# Patient Record
Sex: FEMALE | URBAN - METROPOLITAN AREA
[De-identification: names, ages, dates, MRNs, and addresses within clinical notes are randomized per-mention and may not be internally consistent; named-entity substitution may affect disease eponyms.]

---

## 2024-09-30 ENCOUNTER — APPOINTMENT (RX ONLY)
Dept: URBAN - METROPOLITAN AREA CLINIC 151 | Facility: CLINIC | Age: 39
Setting detail: DERMATOLOGY
End: 2024-09-30

## 2024-09-30 DIAGNOSIS — L63.8 OTHER ALOPECIA AREATA: ICD-10-CM

## 2024-09-30 PROCEDURE — ? PRESCRIPTION MEDICATION MANAGEMENT

## 2024-09-30 PROCEDURE — ? ORDER TESTS

## 2024-09-30 PROCEDURE — ? PRESCRIPTION

## 2024-09-30 PROCEDURE — ? DIAGNOSIS COMMENT

## 2024-09-30 PROCEDURE — 99204 OFFICE O/P NEW MOD 45 MIN: CPT

## 2024-09-30 PROCEDURE — ? COUNSELING

## 2024-09-30 RX ORDER — BARICITINIB 2 MG/1
TABLET, FILM COATED ORAL
Qty: 30 | Refills: 4 | Status: ERX | COMMUNITY
Start: 2024-09-30

## 2024-09-30 RX ADMIN — BARICITINIB: 2 TABLET, FILM COATED ORAL at 00:00

## 2024-09-30 ASSESSMENT — LOCATION DETAILED DESCRIPTION DERM: LOCATION DETAILED: MID-FRONTAL SCALP

## 2024-09-30 ASSESSMENT — LOCATION ZONE DERM: LOCATION ZONE: SCALP

## 2024-09-30 ASSESSMENT — LOCATION SIMPLE DESCRIPTION DERM: LOCATION SIMPLE: ANTERIOR SCALP

## 2024-09-30 NOTE — HPI: OTHER
Condition:: Alopecia Areata
Please Describe Your Condition:: HISTORY:  Karissa is a 38-year-old type-III skin  woman with a past medical history significant for alopecia areata that initially presented when she was 13 years of age that was focal in involvement.  During COVID, she noticed significant hair loss with approximately 80% hair loss at its worse when she initially saw Dr. Abdiaziz Briseno.  She noticed some spontaneous regrowth before starting Olumiant.  She was started on Olumiant 2 mg daily in November 2022 with excellent regrowth of her hair.  She has noted a full regrowth until recently when she had two focal areas of alopecia on occipital scalp.  She attributes this to the stress of moving.  She denies any family history of alopecia areata, but notes that her mother has thinning hair.  She denies any hair loss in other areas on her body and has no history of eyebrow or eyelash hair loss.  There are no nail changes noted during any of the episodes of her alopecia areata.\\n\\nHer primary care physician is, Ms. Geovanna York.  She has [employer] insurance and has been unable to get the Olumiant covered under her insurance.  Her most recent blood work was done in April 2024, which noted CBC, chem-7, and LFTs within normal limits.  There were no QuantiFERON Gold or cholesterol test included on this examination.\\n\\nPHYSICAL EXAMINATION:  Her physical examination is significant for focal patches of partial alopecia on her occipital scalp.  Her SALT score is 3.  Her ClinRO eyebrow and eyelash is 0 for both.  There are no nail changes appreciated.

## 2024-09-30 NOTE — PROCEDURE: ORDER TESTS
Expected Date Of Service: 09/30/2024
Billing Type: Third-Party Bill
Performing Laboratory: [id number]
Bill For Surgical Tray: no
Lab Facility: [id number]

## 2024-09-30 NOTE — PROCEDURE: PRESCRIPTION MEDICATION MANAGEMENT
Render In Strict Bullet Format?: Yes
Detail Level: Zone
Initiate Treatment: Olumiant 2 mg tablet: Take 1 po QD

## 2024-09-30 NOTE — PROCEDURE: DIAGNOSIS COMMENT
Render Risk Assessment In Note?: yes
Comment: SALT: 3%\\n\\nIMPRESSION AND PLAN:  I feel that Karissa has alopecia areata.  This responded quite nicely at 2 mg of Olumiant daily.  Her worst hair loss was approximately SALT of 80.  Since we have no blood work showing a recent QuantiFERON Gold and cholesterol level, she was provided a lab slip for CBC, cholesterol, LFTs, and QuantiFERON Gold.  I asked Karissa to have her most recent blood work sent over here, so we potentially not duplicate any labs that were checked recently.  Given her excellent response on Olumiant, we will continue Olumiant 2 mg daily.  If she notes any worsening of her alopecia areata, we would consider increasing to 4 mg daily and with additional addition of minoxidil 1.25 mg to 2.5 mg daily.  She is to return in six months for followup.\\n\\n
Detail Level: Zone

## 2025-04-08 ENCOUNTER — APPOINTMENT (OUTPATIENT)
Dept: URBAN - METROPOLITAN AREA CLINIC 151 | Facility: CLINIC | Age: 40
Setting detail: DERMATOLOGY
End: 2025-04-08

## 2025-04-08 DIAGNOSIS — L63.8 OTHER ALOPECIA AREATA: ICD-10-CM | Status: WELL CONTROLLED

## 2025-04-08 PROCEDURE — ? ORDER TESTS

## 2025-04-08 PROCEDURE — 99214 OFFICE O/P EST MOD 30 MIN: CPT

## 2025-04-08 PROCEDURE — ? DIAGNOSIS COMMENT

## 2025-04-08 PROCEDURE — G2211 COMPLEX E/M VISIT ADD ON: HCPCS

## 2025-04-08 PROCEDURE — ? COUNSELING

## 2025-04-08 PROCEDURE — ? PRESCRIPTION

## 2025-04-08 PROCEDURE — ? PRESCRIPTION MEDICATION MANAGEMENT

## 2025-04-08 PROCEDURE — ? VISIT COMPLEXITY

## 2025-04-08 RX ORDER — BARICITINIB 2 MG/1
TABLET, FILM COATED ORAL
Qty: 30 | Refills: 4 | Status: ACTIVE

## 2025-04-08 ASSESSMENT — LOCATION DETAILED DESCRIPTION DERM: LOCATION DETAILED: MID-FRONTAL SCALP

## 2025-04-08 ASSESSMENT — LOCATION ZONE DERM: LOCATION ZONE: SCALP

## 2025-04-08 ASSESSMENT — LOCATION SIMPLE DESCRIPTION DERM: LOCATION SIMPLE: ANTERIOR SCALP

## 2025-04-08 NOTE — PROCEDURE: DIAGNOSIS COMMENT
Render Risk Assessment In Note?: yes
Comment: SALT: 0% CLINRO eyebrow and eyelash: 0 \\n\\nPt is responding well on Oluminant 2mg. (NOT COVERED BY  as they consider AA cosmetic) Discussed initiating minoxidil 1.25mg, reviewed risks and expectations of medication. Pt defers. Pt is seeing PCP in a few months and will send in lab work, sent lab slip for Quantiferon gold to be provided. Labs reviewed from 10/2024, WNL.
Detail Level: Zone

## 2025-04-08 NOTE — PROCEDURE: ORDER TESTS
Expected Date Of Service: 09/30/2024
Billing Type: Third-Party Bill
Performing Laboratory: [id number]
Bill For Surgical Tray: no
Lab Facility: [id number]

## 2025-04-08 NOTE — HPI: OTHER
Condition:: AA fu
Please Describe Your Condition:: Pt states 100% improvement while on Oluminant 2mg. She states having the flu a couple of weeks  ago and lost a patch of hair but is now growing back in. Pt denies side effects or concerns.

## 2025-04-08 NOTE — PROCEDURE: PRESCRIPTION MEDICATION MANAGEMENT
Render In Strict Bullet Format?: Yes
Continue Regimen: Olumiant 2 mg tablet: Take 1 po QD
Detail Level: Zone

## 2025-08-26 ENCOUNTER — APPOINTMENT (OUTPATIENT)
Dept: URBAN - METROPOLITAN AREA CLINIC 151 | Facility: CLINIC | Age: 40
Setting detail: DERMATOLOGY
End: 2025-08-26

## 2025-08-26 DIAGNOSIS — L63.8 OTHER ALOPECIA AREATA: ICD-10-CM

## 2025-08-26 PROCEDURE — ? COUNSELING

## 2025-08-26 PROCEDURE — ? ORDER TESTS

## 2025-08-26 ASSESSMENT — LOCATION SIMPLE DESCRIPTION DERM: LOCATION SIMPLE: LEFT SCALP

## 2025-08-26 ASSESSMENT — LOCATION ZONE DERM: LOCATION ZONE: SCALP

## 2025-08-26 ASSESSMENT — LOCATION DETAILED DESCRIPTION DERM: LOCATION DETAILED: LEFT MEDIAL FRONTAL SCALP
